# Patient Record
(demographics unavailable — no encounter records)

---

## 2025-02-21 NOTE — HISTORY OF PRESENT ILLNESS
[FreeTextEntry1] : 91 yo female with history of pad s/p right sfa stent presents for follow up.  pt denies any lower extremity wounds.  pt reports lower extremity pain when her sugar levels are high.  pt is currently on statin and plavix

## 2025-02-21 NOTE — PHYSICAL EXAM
[JVD] : no jugular venous distention  [2+] : right 2+ [Ankle Swelling (On Exam)] : present [Ankle Swelling Bilaterally] : bilaterally  [Ankle Swelling On The Right] : mild [Varicose Veins Of Lower Extremities] : not present [] : not present [No Rash or Lesion] : No rash or lesion [Skin Ulcer] : no ulcer [Alert] : alert [Calm] : calm [de-identified] : appears well

## 2025-02-21 NOTE — REASON FOR VISIT
[Follow-Up: _____] : a [unfilled] follow-up visit [Language Line ] : provided by Language Line   [Interpreters_IDNumber] : [id number] [Interpreters_FullName] : Mann

## 2025-02-21 NOTE — ASSESSMENT
[FreeTextEntry1] : 91 yo female with history of pad s/p right sfa stent presents for follow up  duplex shows native proximal sfa stenosis of >75% with patent sfa stent with 50% stenosis at the origin and 50-75% stenosis of the tpt (unchanged from previous visit   Patient with complaint of lower extremity pain.  Patient is a very poor historian.  Arterial Dopplers and PVRs show adequate circulation to bilateral lower extremity.  I do not believe symptoms are vascular in origin and recommend neurology evaluation.  Continue Plavix every other day along with aspirin.  Continue rosuvastatin for treatment of hyperlipidemia

## 2025-05-19 NOTE — REASON FOR VISIT
[Hyperlipidemia] : hyperlipidemia [Hypertension] : hypertension [Family Member] : family member [FreeTextEntry1] :  Isaías #[id number]  Dear Dr. Farhad Diaz:  I had the pleasure evaluating your patient Ms. Jossy Choi who you had kindly referred for her initial cardiovascular consultation.  She is a 92 yo woman with HLD, HTN, DM2, LE PAD, CVA, recent hospitalization for GIB (now stable-no source).  She otherwise denies having chest pain, palpitations, dyspnea, orthopnea/PND, syncope, or edema.   No recent illness.  Denies current toxic habits.  No significant FH early CAD/SCD/CVA/VTE.  She reports having had a recent TTE at an OSH ~2 months ago when hospitalized for GIB (records not available for my review).  My review of her 12-lead ECG demonstrates SR, normal axis, normal intervals.  Physical examination is unremarkable.   Appears euvolemic on exam.  As she appears clinically stable and asymptomatic, no further cardiac testing is needed at this time. Her BPs are controlled on current regimen which she was advised to continue. Asked that she obtain records of outside cardiac tests for my review.  If stable, f/u in six months.  Thank you again for entrusting her care with me.    Warmest regards, Aaron Nava